# Patient Record
Sex: MALE | Race: WHITE | NOT HISPANIC OR LATINO | URBAN - NONMETROPOLITAN AREA
[De-identification: names, ages, dates, MRNs, and addresses within clinical notes are randomized per-mention and may not be internally consistent; named-entity substitution may affect disease eponyms.]

---

## 2018-03-26 PROBLEM — H52.223: Noted: 2017-01-06

## 2018-03-26 PROBLEM — H25.013: Noted: 2018-03-26

## 2018-03-26 PROBLEM — H52.13: Noted: 2017-01-06

## 2019-10-11 ENCOUNTER — IMPORTED ENCOUNTER (OUTPATIENT)
Dept: URBAN - NONMETROPOLITAN AREA CLINIC 1 | Facility: CLINIC | Age: 44
End: 2019-10-11

## 2019-10-11 PROCEDURE — 92310 CONTACT LENS FITTING OU: CPT

## 2019-10-11 PROCEDURE — 92015 DETERMINE REFRACTIVE STATE: CPT

## 2019-10-11 PROCEDURE — 92014 COMPRE OPH EXAM EST PT 1/>: CPT

## 2019-10-11 NOTE — PATIENT DISCUSSION
Myopia /  Astigmatism OU- Discussed diagnosis in detail with patient- New glasses and contact lens Rx given today- Continue to monitor- RTC 1 year completeTrace Cortical Cataract OU- Discussed diagnosis in detail with patient- Discussed signs and symptoms of progression- Discussed UV protection- No treatment needed at this time- Continue to monitor; 's Notes: MR 10/11/19

## 2021-01-06 ENCOUNTER — IMPORTED ENCOUNTER (OUTPATIENT)
Dept: URBAN - NONMETROPOLITAN AREA CLINIC 1 | Facility: CLINIC | Age: 46
End: 2021-01-06

## 2021-01-06 PROCEDURE — 92014 COMPRE OPH EXAM EST PT 1/>: CPT

## 2021-01-06 PROCEDURE — 92015 DETERMINE REFRACTIVE STATE: CPT

## 2021-01-06 PROCEDURE — 92310 CONTACT LENS FITTING OU: CPT

## 2022-01-07 ENCOUNTER — IMPORTED ENCOUNTER (OUTPATIENT)
Dept: URBAN - NONMETROPOLITAN AREA CLINIC 1 | Facility: CLINIC | Age: 47
End: 2022-01-07

## 2022-01-07 PROCEDURE — 92310 CONTACT LENS FITTING OU: CPT

## 2022-01-07 PROCEDURE — 92014 COMPRE OPH EXAM EST PT 1/>: CPT

## 2022-01-07 PROCEDURE — 92015 DETERMINE REFRACTIVE STATE: CPT

## 2022-01-07 NOTE — PATIENT DISCUSSION
Myopia /  Astigmatism OU- Discussed diagnosis in detail with patient- New glasses and contact lens Rx given today- Continue to monitor- RTC 1 year completeCataracts OU- Discussed diagnosis in detail with patient- Discussed signs and symptoms of progression- Discussed UV protection- No treatment needed at this time- Continue to monitor; 's Notes: MR 10/11/19

## 2022-04-09 ASSESSMENT — VISUAL ACUITY
OD_SC: 20/25+1
OD_SC: 20/20
OS_SC: 20/20
OD_SC: 20/20
OS_SC: 20/20
OD_SC: 20/20
OS_SC: 20/25
OS_SC: 20/25

## 2022-04-09 ASSESSMENT — TONOMETRY
OS_IOP_MMHG: 18
OS_IOP_MMHG: 12
OD_IOP_MMHG: 17
OD_IOP_MMHG: 12
OS_IOP_MMHG: 19
OD_IOP_MMHG: 18

## 2023-01-09 ENCOUNTER — ESTABLISHED PATIENT (OUTPATIENT)
Dept: URBAN - NONMETROPOLITAN AREA CLINIC 1 | Facility: CLINIC | Age: 48
End: 2023-01-09

## 2023-01-09 DIAGNOSIS — H52.4: ICD-10-CM

## 2023-01-09 PROCEDURE — 92015 DETERMINE REFRACTIVE STATE: CPT

## 2023-01-09 PROCEDURE — 92310-E CONTACT LENS FITTING ESTABLISH PATIENT

## 2023-01-09 PROCEDURE — 92014 COMPRE OPH EXAM EST PT 1/>: CPT

## 2023-01-09 ASSESSMENT — VISUAL ACUITY
OD_CC: 20/22
OS_CC: 20/20

## 2023-01-09 ASSESSMENT — TONOMETRY
OD_IOP_MMHG: 16
OS_IOP_MMHG: 16

## 2024-04-19 ENCOUNTER — ESTABLISHED PATIENT (OUTPATIENT)
Dept: URBAN - NONMETROPOLITAN AREA CLINIC 1 | Facility: CLINIC | Age: 49
End: 2024-04-19

## 2024-04-19 DIAGNOSIS — H52.4: ICD-10-CM

## 2024-04-19 PROCEDURE — 92015 DETERMINE REFRACTIVE STATE: CPT

## 2024-04-19 PROCEDURE — 92310-E CONTACT LENS FITTING ESTABLISH PATIENT

## 2024-04-19 PROCEDURE — 92014 COMPRE OPH EXAM EST PT 1/>: CPT

## 2024-04-19 ASSESSMENT — TONOMETRY
OD_IOP_MMHG: 18
OS_IOP_MMHG: 20

## 2024-04-19 ASSESSMENT — VISUAL ACUITY
OS_CC: 20/20
OU_CC: 20/20

## 2025-05-16 ENCOUNTER — COMPREHENSIVE EXAM (OUTPATIENT)
Age: 50
End: 2025-05-16

## 2025-05-16 DIAGNOSIS — H52.4: ICD-10-CM

## 2025-05-16 PROCEDURE — 92014 COMPRE OPH EXAM EST PT 1/>: CPT

## 2025-05-16 PROCEDURE — 92310-1 LEVEL 1 SOFT LENS UPDATE

## 2025-05-16 PROCEDURE — 92015 DETERMINE REFRACTIVE STATE: CPT
